# Patient Record
Sex: MALE | Race: WHITE
[De-identification: names, ages, dates, MRNs, and addresses within clinical notes are randomized per-mention and may not be internally consistent; named-entity substitution may affect disease eponyms.]

---

## 2021-04-09 ENCOUNTER — HOSPITAL ENCOUNTER (INPATIENT)
Dept: HOSPITAL 94 - ER | Age: 46
LOS: 5 days | Discharge: HOME HEALTH | DRG: 640 | End: 2021-04-14
Attending: FAMILY MEDICINE | Admitting: INTERNAL MEDICINE
Payer: MEDICARE

## 2021-04-09 VITALS — WEIGHT: 248.46 LBS | BODY MASS INDEX: 39 KG/M2 | HEIGHT: 67 IN

## 2021-04-09 DIAGNOSIS — I12.0: ICD-10-CM

## 2021-04-09 DIAGNOSIS — N18.6: ICD-10-CM

## 2021-04-09 DIAGNOSIS — Z88.0: ICD-10-CM

## 2021-04-09 DIAGNOSIS — E87.5: Primary | ICD-10-CM

## 2021-04-09 DIAGNOSIS — R26.2: ICD-10-CM

## 2021-04-09 DIAGNOSIS — Z88.2: ICD-10-CM

## 2021-04-09 DIAGNOSIS — Z91.15: ICD-10-CM

## 2021-04-09 DIAGNOSIS — E11.22: ICD-10-CM

## 2021-04-09 DIAGNOSIS — Z79.890: ICD-10-CM

## 2021-04-09 DIAGNOSIS — R19.7: ICD-10-CM

## 2021-04-09 DIAGNOSIS — E03.9: ICD-10-CM

## 2021-04-09 DIAGNOSIS — Z79.4: ICD-10-CM

## 2021-04-09 DIAGNOSIS — E87.70: ICD-10-CM

## 2021-04-09 DIAGNOSIS — E66.9: ICD-10-CM

## 2021-04-09 DIAGNOSIS — Z99.2: ICD-10-CM

## 2021-04-09 DIAGNOSIS — Z87.891: ICD-10-CM

## 2021-04-09 PROCEDURE — 70551 MRI BRAIN STEM W/O DYE: CPT

## 2021-04-09 PROCEDURE — 85025 COMPLETE CBC W/AUTO DIFF WBC: CPT

## 2021-04-09 PROCEDURE — 83036 HEMOGLOBIN GLYCOSYLATED A1C: CPT

## 2021-04-09 PROCEDURE — 97110 THERAPEUTIC EXERCISES: CPT

## 2021-04-09 PROCEDURE — 96374 THER/PROPH/DIAG INJ IV PUSH: CPT

## 2021-04-09 PROCEDURE — 99285 EMERGENCY DEPT VISIT HI MDM: CPT

## 2021-04-09 PROCEDURE — 97530 THERAPEUTIC ACTIVITIES: CPT

## 2021-04-09 PROCEDURE — 80048 BASIC METABOLIC PNL TOTAL CA: CPT

## 2021-04-09 PROCEDURE — 72148 MRI LUMBAR SPINE W/O DYE: CPT

## 2021-04-09 PROCEDURE — 93005 ELECTROCARDIOGRAM TRACING: CPT

## 2021-04-09 PROCEDURE — 36415 COLL VENOUS BLD VENIPUNCTURE: CPT

## 2021-04-09 PROCEDURE — 70544 MR ANGIOGRAPHY HEAD W/O DYE: CPT

## 2021-04-09 PROCEDURE — 85008 BL SMEAR W/O DIFF WBC COUNT: CPT

## 2021-04-09 PROCEDURE — 83735 ASSAY OF MAGNESIUM: CPT

## 2021-04-09 PROCEDURE — 72141 MRI NECK SPINE W/O DYE: CPT

## 2021-04-09 PROCEDURE — 97116 GAIT TRAINING THERAPY: CPT

## 2021-04-09 PROCEDURE — 71045 X-RAY EXAM CHEST 1 VIEW: CPT

## 2021-04-09 PROCEDURE — 84443 ASSAY THYROID STIM HORMONE: CPT

## 2021-04-09 PROCEDURE — 97162 PT EVAL MOD COMPLEX 30 MIN: CPT

## 2021-04-09 PROCEDURE — 72146 MRI CHEST SPINE W/O DYE: CPT

## 2021-04-09 PROCEDURE — 84100 ASSAY OF PHOSPHORUS: CPT

## 2021-04-09 PROCEDURE — 72131 CT LUMBAR SPINE W/O DYE: CPT

## 2021-04-09 PROCEDURE — 82948 REAGENT STRIP/BLOOD GLUCOSE: CPT

## 2021-04-09 NOTE — NUR
dr. behel updated regarding pt's med req issue- pt's caretaker will call back 
from [phone] tomorrow with list.

## 2021-04-09 NOTE — NUR
PT UNABLE TO PROVIDE MED REQ & INFO WAS NOT IN THE PAPERWORK FROM [facility]. PT'S 
CARETAKER IS AT WORK AND DOES NOT HAVE THIS INFO. PHARMACY UPDATED.

## 2021-04-10 VITALS — DIASTOLIC BLOOD PRESSURE: 82 MMHG | SYSTOLIC BLOOD PRESSURE: 116 MMHG

## 2021-04-10 VITALS — SYSTOLIC BLOOD PRESSURE: 132 MMHG | DIASTOLIC BLOOD PRESSURE: 76 MMHG

## 2021-04-10 VITALS — DIASTOLIC BLOOD PRESSURE: 99 MMHG | SYSTOLIC BLOOD PRESSURE: 144 MMHG

## 2021-04-10 LAB
ALBUMIN SERPL BCP-MCNC: 1.9 G/DL (ref 3.4–5)
ANION GAP SERPL CALCULATED.3IONS-SCNC: 4 MMOL/L (ref 8–16)
ANISOCYTOSIS BLD QL SMEAR: (no result)
BASOPHILS # BLD AUTO: 0.1 X10'3 (ref 0–0.2)
BASOPHILS NFR BLD AUTO: 1.6 % (ref 0–1)
BUN SERPL-MCNC: 54 MG/DL (ref 7–18)
BUN/CREAT SERPL: 8.8 (ref 5.4–32)
CALCIUM SERPL-MCNC: 8.8 MG/DL (ref 8.5–10.1)
CHLORIDE SERPL-SCNC: 99 MMOL/L (ref 99–107)
CO2 SERPL-SCNC: 31.6 MMOL/L (ref 24–32)
CREAT SERPL-MCNC: 6.12 MG/DL (ref 0.6–1.1)
EOSINOPHIL # BLD AUTO: 0.3 X10'3 (ref 0–0.9)
EOSINOPHIL NFR BLD AUTO: 3.7 % (ref 0–6)
ERYTHROCYTE [DISTWIDTH] IN BLOOD BY AUTOMATED COUNT: 19.1 % (ref 11.5–14.5)
GFR SERPL CREATININE-BSD FRML MDRD: 10 ML/MIN
GLUCOSE SERPL-MCNC: 105 MG/DL (ref 70–104)
HCT VFR BLD AUTO: 34 % (ref 42–52)
HGB BLD-MCNC: 11 G/DL (ref 14–17.9)
LYMPHOCYTES # BLD AUTO: 1.5 X10'3 (ref 1.1–4.8)
LYMPHOCYTES NFR BLD AUTO: 20.6 % (ref 21–51)
MCH RBC QN AUTO: 30.4 PG (ref 27–31)
MCHC RBC AUTO-ENTMCNC: 32.5 G/DL (ref 33–36.5)
MCV RBC AUTO: 93.5 FL (ref 78–98)
MONOCYTES # BLD AUTO: 1 X10'3 (ref 0–0.9)
MONOCYTES NFR BLD AUTO: 12.9 % (ref 2–12)
NEUTROPHILS # BLD AUTO: 4.5 X10'3 (ref 1.8–7.7)
NEUTROPHILS NFR BLD AUTO: 61.2 % (ref 42–75)
PLATELET # BLD AUTO: 219 X10'3 (ref 140–440)
PLATELET BLD QL SMEAR: NORMAL
PMV BLD AUTO: 7.9 FL (ref 7.4–10.4)
POTASSIUM SERPL-SCNC: 5.3 MMOL/L (ref 3.5–5.1)
RBC # BLD AUTO: 3.63 X10'6 (ref 4.7–6.1)
RBC MORPH BLD: (no result)
SCHISTOCYTES BLD QL SMEAR: (no result)
SODIUM SERPL-SCNC: 135 MMOL/L (ref 135–145)
WBC # BLD AUTO: 7.4 X10'3 (ref 4.5–11)

## 2021-04-10 PROCEDURE — 5A1D70Z PERFORMANCE OF URINARY FILTRATION, INTERMITTENT, LESS THAN 6 HOURS PER DAY: ICD-10-PCS | Performed by: INTERNAL MEDICINE

## 2021-04-10 RX ADMIN — INSULIN LISPRO SCH UNITS: 100 INJECTION, SOLUTION INTRAVENOUS; SUBCUTANEOUS at 22:06

## 2021-04-10 RX ADMIN — INSULIN LISPRO SCH UNITS: 100 INJECTION, SOLUTION INTRAVENOUS; SUBCUTANEOUS at 19:39

## 2021-04-10 RX ADMIN — INSULIN GLARGINE SCH UNIT: 100 INJECTION, SOLUTION SUBCUTANEOUS at 22:03

## 2021-04-10 NOTE — NUR
NEO MATTA 2027B: Pt is type 2 diabetic confirmed by daughter today. Please put order 
in for hyper/hypoglycemic protocol. thanks- viki smiley ext [phone]

## 2021-04-10 NOTE — NUR
Dr Hooper called asking why the pt wasn't admitted to a room yet as he needs 
dialysis today. Let him know they are working on it. He said to call the 
dialysis nurse when he is heading to a room. Spoke with Oliva, nursing 
supervisor regarding this and she said that it will be at least 2 1/2 hours 
before there will be a bed available. They are aware that the pt needs dialysis 
today.

## 2021-04-11 VITALS — DIASTOLIC BLOOD PRESSURE: 86 MMHG | SYSTOLIC BLOOD PRESSURE: 119 MMHG

## 2021-04-11 VITALS — DIASTOLIC BLOOD PRESSURE: 84 MMHG | SYSTOLIC BLOOD PRESSURE: 123 MMHG

## 2021-04-11 VITALS — DIASTOLIC BLOOD PRESSURE: 78 MMHG | SYSTOLIC BLOOD PRESSURE: 137 MMHG

## 2021-04-11 VITALS — DIASTOLIC BLOOD PRESSURE: 80 MMHG | SYSTOLIC BLOOD PRESSURE: 118 MMHG

## 2021-04-11 LAB
ALBUMIN SERPL BCP-MCNC: 1.9 G/DL (ref 3.4–5)
ANION GAP SERPL CALCULATED.3IONS-SCNC: 7 MMOL/L (ref 8–16)
ANISOCYTOSIS BLD QL SMEAR: (no result)
BASOPHILS # BLD AUTO: 0.1 X10'3 (ref 0–0.2)
BASOPHILS NFR BLD AUTO: 1.9 % (ref 0–1)
BUN SERPL-MCNC: 51 MG/DL (ref 7–18)
BUN/CREAT SERPL: 9.3 (ref 5.4–32)
BURR CELLS BLD QL SMEAR: (no result)
CALCIUM SERPL-MCNC: 8.2 MG/DL (ref 8.5–10.1)
CHLORIDE SERPL-SCNC: 98 MMOL/L (ref 99–107)
CO2 SERPL-SCNC: 29.3 MMOL/L (ref 24–32)
CREAT SERPL-MCNC: 5.51 MG/DL (ref 0.6–1.1)
EOSINOPHIL # BLD AUTO: 0.2 X10'3 (ref 0–0.9)
EOSINOPHIL NFR BLD AUTO: 3.1 % (ref 0–6)
ERYTHROCYTE [DISTWIDTH] IN BLOOD BY AUTOMATED COUNT: 19.1 % (ref 11.5–14.5)
GFR SERPL CREATININE-BSD FRML MDRD: 11 ML/MIN
GLUCOSE SERPL-MCNC: 364 MG/DL (ref 70–104)
HCT VFR BLD AUTO: 31.3 % (ref 42–52)
HGB BLD-MCNC: 10.3 G/DL (ref 14–17.9)
HYPOCHROMIA BLD QL SMEAR: (no result)
LYMPHOCYTES # BLD AUTO: 1.3 X10'3 (ref 1.1–4.8)
LYMPHOCYTES NFR BLD AUTO: 19 % (ref 21–51)
MAGNESIUM SERPL-MCNC: 1.9 MG/DL (ref 1.5–2.4)
MCH RBC QN AUTO: 31 PG (ref 27–31)
MCHC RBC AUTO-ENTMCNC: 32.9 G/DL (ref 33–36.5)
MCV RBC AUTO: 94.3 FL (ref 78–98)
MONOCYTES # BLD AUTO: 0.7 X10'3 (ref 0–0.9)
MONOCYTES NFR BLD AUTO: 11 % (ref 2–12)
NEUTROPHILS # BLD AUTO: 4.4 X10'3 (ref 1.8–7.7)
NEUTROPHILS NFR BLD AUTO: 65 % (ref 42–75)
PHOSPHATE SERPL-MCNC: 5.3 MG/DL (ref 2.3–4.5)
PLATELET # BLD AUTO: 217 X10'3 (ref 140–440)
PLATELET BLD QL SMEAR: NORMAL
PMV BLD AUTO: 8.6 FL (ref 7.4–10.4)
POLYCHROMASIA BLD QL SMEAR: (no result)
POTASSIUM SERPL-SCNC: 5.1 MMOL/L (ref 3.5–5.1)
RBC # BLD AUTO: 3.32 X10'6 (ref 4.7–6.1)
RBC MORPH BLD: (no result)
SCHISTOCYTES BLD QL SMEAR: (no result)
SODIUM SERPL-SCNC: 134 MMOL/L (ref 135–145)
WBC # BLD AUTO: 6.8 X10'3 (ref 4.5–11)

## 2021-04-11 RX ADMIN — INSULIN LISPRO SCH UNITS: 100 INJECTION, SOLUTION INTRAVENOUS; SUBCUTANEOUS at 08:50

## 2021-04-11 RX ADMIN — DEXTROSE MONOHYDRATE PRN ML: 25 INJECTION, SOLUTION INTRAVENOUS at 22:41

## 2021-04-11 RX ADMIN — INSULIN LISPRO SCH UNITS: 100 INJECTION, SOLUTION INTRAVENOUS; SUBCUTANEOUS at 19:13

## 2021-04-11 RX ADMIN — LEVOTHYROXINE SODIUM ANHYDROUS SCH MCG: 100 INJECTION, POWDER, LYOPHILIZED, FOR SOLUTION INTRAVENOUS at 11:45

## 2021-04-11 RX ADMIN — INSULIN GLARGINE SCH UNIT: 100 INJECTION, SOLUTION SUBCUTANEOUS at 21:00

## 2021-04-11 RX ADMIN — DEXTROSE MONOHYDRATE PRN ML: 25 INJECTION, SOLUTION INTRAVENOUS at 21:07

## 2021-04-11 RX ADMIN — INSULIN LISPRO SCH UNITS: 100 INJECTION, SOLUTION INTRAVENOUS; SUBCUTANEOUS at 13:32

## 2021-04-11 NOTE — NUR
Pt BS 55. Immediate re-check was 56. Pt asymptomatic, A&O, requested food. 2-OJ and sandwich 
given. Re-check in 15min was 63. 25ml D50 given. Re-check 98, will re-check in an hour. Pt 
also refused Lantus. 

-------------------------------------------------------------------------------

Addendum: 04/12/21 at 0521 by Karlee PURCELL RN

-------------------------------------------------------------------------------

Dr. Middleton notified of intermittent hypoglycemia and 25ml D50 given x2. No new orders d/t 
pt not needing additional fluids. During hypoglycemia, pt remained asymptomatic. Food and 
juice consumed. See MAR.

## 2021-04-11 NOTE — NUR
DM Consult: Pt admit DX fluid overload hx ESRD on HD and T2DM now insulin dependent per EMR. 
Per MD note, pt non-compliant w/ HD attendance and A1C this admit over 14. Pt seen by RD for 
written/verbal DM ed w/ RD contact information provided. Pt reports taking Novolog at meal 
times, and Trujeo once daily per Rx, checks GLU routinely, and has no issues receiving 
refills, test strips, or taking meds. Pt reports GLU prior avg at home in 500's w/ A1C in 
13's but now recently in 200's; RD notified pt of current A1C over 14 and reviewed diet 
strategies for optimal carb portion sizing. Pt reports does not eat lunch typically on eats 
no CHO WB; RD educated pt on importance of consistent CHO intake and nutrition facts label 
interpretations to ensure appropriate CHO intake as well. Pt reports is trying to get 
established w/ local MD for DM management. RD encouraged pt to d/w PCP regarding 
endocrinologist coverage for DM given as medically able given A1C. Pt reports no further 
questions/concerns at this time. YOGESH d/w RN regarding carb controlled diet addition if MD 
agreeable given hx insulin-dependent DM  and  at this time only on renal diet.

-------------------------------------------------------------------------------

Addendum: 04/11/21 at 1319 by Paulo Ochoa RD

-------------------------------------------------------------------------------

Amended: Links added.

## 2021-04-11 NOTE — NUR
HD pt, is anuric no void this shift

-------------------------------------------------------------------------------

Addendum: 04/11/21 at 0437 by Mani Bradshaw RN

-------------------------------------------------------------------------------

Amended: Links added.

## 2021-04-12 VITALS — DIASTOLIC BLOOD PRESSURE: 74 MMHG | SYSTOLIC BLOOD PRESSURE: 116 MMHG

## 2021-04-12 VITALS — DIASTOLIC BLOOD PRESSURE: 80 MMHG | SYSTOLIC BLOOD PRESSURE: 121 MMHG

## 2021-04-12 VITALS — DIASTOLIC BLOOD PRESSURE: 91 MMHG | SYSTOLIC BLOOD PRESSURE: 139 MMHG

## 2021-04-12 VITALS — DIASTOLIC BLOOD PRESSURE: 61 MMHG | SYSTOLIC BLOOD PRESSURE: 98 MMHG

## 2021-04-12 VITALS — SYSTOLIC BLOOD PRESSURE: 120 MMHG | DIASTOLIC BLOOD PRESSURE: 64 MMHG

## 2021-04-12 VITALS — SYSTOLIC BLOOD PRESSURE: 143 MMHG | DIASTOLIC BLOOD PRESSURE: 84 MMHG

## 2021-04-12 VITALS — SYSTOLIC BLOOD PRESSURE: 124 MMHG | DIASTOLIC BLOOD PRESSURE: 64 MMHG

## 2021-04-12 LAB
ALBUMIN SERPL BCP-MCNC: 1.8 G/DL (ref 3.4–5)
ANION GAP SERPL CALCULATED.3IONS-SCNC: 6 MMOL/L (ref 8–16)
BASOPHILS # BLD AUTO: 0.1 X10'3 (ref 0–0.2)
BASOPHILS NFR BLD AUTO: 1.2 % (ref 0–1)
BUN SERPL-MCNC: 60 MG/DL (ref 7–18)
BUN/CREAT SERPL: 10 (ref 5.4–32)
CALCIUM SERPL-MCNC: 8.6 MG/DL (ref 8.5–10.1)
CHLORIDE SERPL-SCNC: 97 MMOL/L (ref 99–107)
CO2 SERPL-SCNC: 29.7 MMOL/L (ref 24–32)
CREAT SERPL-MCNC: 6.01 MG/DL (ref 0.6–1.1)
EOSINOPHIL # BLD AUTO: 0.3 X10'3 (ref 0–0.9)
EOSINOPHIL NFR BLD AUTO: 3.6 % (ref 0–6)
ERYTHROCYTE [DISTWIDTH] IN BLOOD BY AUTOMATED COUNT: 19.1 % (ref 11.5–14.5)
GFR SERPL CREATININE-BSD FRML MDRD: 10 ML/MIN
GLUCOSE SERPL-MCNC: 116 MG/DL (ref 70–104)
HCT VFR BLD AUTO: 32.3 % (ref 42–52)
HGB BLD-MCNC: 10.7 G/DL (ref 14–17.9)
LYMPHOCYTES # BLD AUTO: 1.5 X10'3 (ref 1.1–4.8)
LYMPHOCYTES NFR BLD AUTO: 18.6 % (ref 21–51)
MAGNESIUM SERPL-MCNC: 1.8 MG/DL (ref 1.5–2.4)
MCH RBC QN AUTO: 30.7 PG (ref 27–31)
MCHC RBC AUTO-ENTMCNC: 33 G/DL (ref 33–36.5)
MCV RBC AUTO: 93.1 FL (ref 78–98)
MONOCYTES # BLD AUTO: 1 X10'3 (ref 0–0.9)
MONOCYTES NFR BLD AUTO: 12.1 % (ref 2–12)
NEUTROPHILS # BLD AUTO: 5.2 X10'3 (ref 1.8–7.7)
NEUTROPHILS NFR BLD AUTO: 64.5 % (ref 42–75)
PHOSPHATE SERPL-MCNC: 5.7 MG/DL (ref 2.3–4.5)
PLATELET # BLD AUTO: 226 X10'3 (ref 140–440)
PMV BLD AUTO: 8.2 FL (ref 7.4–10.4)
POTASSIUM SERPL-SCNC: 5 MMOL/L (ref 3.5–5.1)
RBC # BLD AUTO: 3.47 X10'6 (ref 4.7–6.1)
SODIUM SERPL-SCNC: 133 MMOL/L (ref 135–145)
WBC # BLD AUTO: 8.1 X10'3 (ref 4.5–11)

## 2021-04-12 PROCEDURE — 5A1D70Z PERFORMANCE OF URINARY FILTRATION, INTERMITTENT, LESS THAN 6 HOURS PER DAY: ICD-10-PCS | Performed by: INTERNAL MEDICINE

## 2021-04-12 RX ADMIN — DEXTROSE MONOHYDRATE PRN ML: 25 INJECTION, SOLUTION INTRAVENOUS at 05:59

## 2021-04-12 RX ADMIN — INSULIN LISPRO SCH UNITS: 100 INJECTION, SOLUTION INTRAVENOUS; SUBCUTANEOUS at 20:48

## 2021-04-12 RX ADMIN — INSULIN GLARGINE SCH UNIT: 100 INJECTION, SOLUTION SUBCUTANEOUS at 20:49

## 2021-04-12 RX ADMIN — Medication PRN G: at 04:26

## 2021-04-12 RX ADMIN — INSULIN LISPRO SCH UNITS: 100 INJECTION, SOLUTION INTRAVENOUS; SUBCUTANEOUS at 19:00

## 2021-04-12 RX ADMIN — Medication PRN G: at 03:48

## 2021-04-12 RX ADMIN — LEVOTHYROXINE SODIUM ANHYDROUS SCH MCG: 100 INJECTION, POWDER, LYOPHILIZED, FOR SOLUTION INTRAVENOUS at 08:15

## 2021-04-12 NOTE — NUR
page to dr longoria: MAGALYS DONATO [id number]: MRI called stating their machine is down, and do not 
have an ETA for when it'll be fixed whether today or tomorrow. . Per Rufus dorantes ok to do 
MRI's outpatient. - [place] ext [phone]

## 2021-04-13 VITALS — SYSTOLIC BLOOD PRESSURE: 150 MMHG | DIASTOLIC BLOOD PRESSURE: 93 MMHG

## 2021-04-13 VITALS — SYSTOLIC BLOOD PRESSURE: 131 MMHG | DIASTOLIC BLOOD PRESSURE: 77 MMHG

## 2021-04-13 VITALS — SYSTOLIC BLOOD PRESSURE: 139 MMHG | DIASTOLIC BLOOD PRESSURE: 73 MMHG

## 2021-04-13 VITALS — SYSTOLIC BLOOD PRESSURE: 139 MMHG | DIASTOLIC BLOOD PRESSURE: 92 MMHG

## 2021-04-13 LAB
ALBUMIN SERPL BCP-MCNC: 1.9 G/DL (ref 3.4–5)
ANION GAP SERPL CALCULATED.3IONS-SCNC: 7 MMOL/L (ref 8–16)
ANISOCYTOSIS BLD QL SMEAR: (no result)
BASOPHILS # BLD AUTO: 0.1 X10'3 (ref 0–0.2)
BASOPHILS NFR BLD AUTO: 1.7 % (ref 0–1)
BUN SERPL-MCNC: 48 MG/DL (ref 7–18)
BUN/CREAT SERPL: 10.2 (ref 5.4–32)
CALCIUM SERPL-MCNC: 8.7 MG/DL (ref 8.5–10.1)
CHLORIDE SERPL-SCNC: 103 MMOL/L (ref 99–107)
CO2 SERPL-SCNC: 28.8 MMOL/L (ref 24–32)
CREAT SERPL-MCNC: 4.71 MG/DL (ref 0.6–1.1)
EOSINOPHIL # BLD AUTO: 0.3 X10'3 (ref 0–0.9)
EOSINOPHIL NFR BLD AUTO: 3.9 % (ref 0–6)
ERYTHROCYTE [DISTWIDTH] IN BLOOD BY AUTOMATED COUNT: 19.3 % (ref 11.5–14.5)
GFR SERPL CREATININE-BSD FRML MDRD: 14 ML/MIN
GLUCOSE SERPL-MCNC: 81 MG/DL (ref 70–104)
HCT VFR BLD AUTO: 31.9 % (ref 42–52)
HGB BLD-MCNC: 10.4 G/DL (ref 14–17.9)
HYPOCHROMIA BLD QL SMEAR: (no result)
LYMPHOCYTES # BLD AUTO: 1.6 X10'3 (ref 1.1–4.8)
LYMPHOCYTES NFR BLD AUTO: 18.9 % (ref 21–51)
MAGNESIUM SERPL-MCNC: 1.9 MG/DL (ref 1.5–2.4)
MCH RBC QN AUTO: 30.7 PG (ref 27–31)
MCHC RBC AUTO-ENTMCNC: 32.6 G/DL (ref 33–36.5)
MCV RBC AUTO: 94.2 FL (ref 78–98)
MONOCYTES # BLD AUTO: 1.1 X10'3 (ref 0–0.9)
MONOCYTES NFR BLD AUTO: 12.4 % (ref 2–12)
NEUTROPHILS # BLD AUTO: 5.4 X10'3 (ref 1.8–7.7)
NEUTROPHILS NFR BLD AUTO: 63.1 % (ref 42–75)
PHOSPHATE SERPL-MCNC: 4.8 MG/DL (ref 2.3–4.5)
PLATELET # BLD AUTO: 233 X10'3 (ref 140–440)
PLATELET BLD QL SMEAR: NORMAL
PMV BLD AUTO: 7.9 FL (ref 7.4–10.4)
POTASSIUM SERPL-SCNC: 4.6 MMOL/L (ref 3.5–5.1)
RBC # BLD AUTO: 3.39 X10'6 (ref 4.7–6.1)
RBC MORPH BLD: (no result)
SCHISTOCYTES BLD QL SMEAR: (no result)
SODIUM SERPL-SCNC: 139 MMOL/L (ref 135–145)
WBC # BLD AUTO: 8.5 X10'3 (ref 4.5–11)

## 2021-04-13 PROCEDURE — 5A1D70Z PERFORMANCE OF URINARY FILTRATION, INTERMITTENT, LESS THAN 6 HOURS PER DAY: ICD-10-PCS | Performed by: INTERNAL MEDICINE

## 2021-04-13 RX ADMIN — INSULIN GLARGINE SCH UNIT: 100 INJECTION, SOLUTION SUBCUTANEOUS at 21:00

## 2021-04-13 RX ADMIN — LEVOTHYROXINE SODIUM ANHYDROUS SCH MCG: 100 INJECTION, POWDER, LYOPHILIZED, FOR SOLUTION INTRAVENOUS at 07:56

## 2021-04-13 NOTE — NUR
Initial: Pt admit dx fluid overload and hyperkalemia. Pt missed 

hemodialysis due to having diarrhea. At latest HD 5 L of fluid was 

removed. Pt PO intake at meals avg 100% on a carb control, renal diet 

partially meeting needs. YOGESH intern discussed with dietary to send double 

protein with meals to meet protein/kcal need and for satiety. Last BM 

4/13. Will continue to monitor for further intervention. 

Recommendations

1) Continue with carb control renal diet

2) Bowel care per rx

3) Double protein with meals for satiety/protein needs on HD

4) Wts with HD

-------------------------------------------------------------------------------

Addendum: 04/13/21 at 1541 by Sally PITTS INTERN YOGESH

-------------------------------------------------------------------------------

Amended: Links added.

-------------------------------------------------------------------------------

Addendum: 04/13/21 at 1544 by Sally PITTS INTERN YOGESH

-------------------------------------------------------------------------------

Correction double protein BIDLD for satiety/protein needs

-------------------------------------------------------------------------------

Addendum: 04/13/21 at 1546 by Paulo Ochoa RD

-------------------------------------------------------------------------------

YOGESH flores w/ above dietetic intern note.

## 2021-04-14 VITALS — DIASTOLIC BLOOD PRESSURE: 82 MMHG | SYSTOLIC BLOOD PRESSURE: 143 MMHG

## 2021-04-14 VITALS — DIASTOLIC BLOOD PRESSURE: 83 MMHG | SYSTOLIC BLOOD PRESSURE: 145 MMHG

## 2021-04-14 VITALS — DIASTOLIC BLOOD PRESSURE: 81 MMHG | SYSTOLIC BLOOD PRESSURE: 148 MMHG

## 2021-04-14 LAB
ALBUMIN SERPL BCP-MCNC: 2.3 G/DL (ref 3.4–5)
ANION GAP SERPL CALCULATED.3IONS-SCNC: 11 MMOL/L (ref 8–16)
ANISOCYTOSIS BLD QL SMEAR: (no result)
BASOPHILS # BLD AUTO: 0.1 X10'3 (ref 0–0.2)
BASOPHILS NFR BLD AUTO: 1.3 % (ref 0–1)
BUN SERPL-MCNC: 48 MG/DL (ref 7–18)
BUN/CREAT SERPL: 10.6 (ref 5.4–32)
CALCIUM SERPL-MCNC: 9 MG/DL (ref 8.5–10.1)
CHLORIDE SERPL-SCNC: 99 MMOL/L (ref 99–107)
CO2 SERPL-SCNC: 25.7 MMOL/L (ref 24–32)
CREAT SERPL-MCNC: 4.52 MG/DL (ref 0.6–1.1)
EOSINOPHIL # BLD AUTO: 0.2 X10'3 (ref 0–0.9)
EOSINOPHIL NFR BLD AUTO: 2.9 % (ref 0–6)
ERYTHROCYTE [DISTWIDTH] IN BLOOD BY AUTOMATED COUNT: 19.1 % (ref 11.5–14.5)
GFR SERPL CREATININE-BSD FRML MDRD: 14 ML/MIN
GLUCOSE SERPL-MCNC: 463 MG/DL (ref 70–104)
HCT VFR BLD AUTO: 29.1 % (ref 42–52)
HGB BLD-MCNC: 9.5 G/DL (ref 14–17.9)
HYPOCHROMIA BLD QL SMEAR: (no result)
LYMPHOCYTES # BLD AUTO: 1.4 X10'3 (ref 1.1–4.8)
LYMPHOCYTES NFR BLD AUTO: 19.7 % (ref 21–51)
MAGNESIUM SERPL-MCNC: 2 MG/DL (ref 1.5–2.4)
MCH RBC QN AUTO: 30.9 PG (ref 27–31)
MCHC RBC AUTO-ENTMCNC: 32.6 G/DL (ref 33–36.5)
MCV RBC AUTO: 94.7 FL (ref 78–98)
MONOCYTES # BLD AUTO: 0.8 X10'3 (ref 0–0.9)
MONOCYTES NFR BLD AUTO: 11.6 % (ref 2–12)
NEUTROPHILS # BLD AUTO: 4.5 X10'3 (ref 1.8–7.7)
NEUTROPHILS NFR BLD AUTO: 64.5 % (ref 42–75)
PHOSPHATE SERPL-MCNC: 5.1 MG/DL (ref 2.3–4.5)
PLATELET # BLD AUTO: 240 X10'3 (ref 140–440)
PLATELET BLD QL SMEAR: NORMAL
PMV BLD AUTO: 8.1 FL (ref 7.4–10.4)
POTASSIUM SERPL-SCNC: 5.5 MMOL/L (ref 3.5–5.1)
RBC # BLD AUTO: 3.07 X10'6 (ref 4.7–6.1)
RBC MORPH BLD: (no result)
SODIUM SERPL-SCNC: 136 MMOL/L (ref 135–145)
TARGETS BLD QL SMEAR: (no result)
WBC # BLD AUTO: 6.9 X10'3 (ref 4.5–11)

## 2021-04-14 RX ADMIN — LEVOTHYROXINE SODIUM ANHYDROUS SCH MCG: 100 INJECTION, POWDER, LYOPHILIZED, FOR SOLUTION INTRAVENOUS at 07:42

## 2021-04-14 RX ADMIN — INSULIN LISPRO SCH UNITS: 100 INJECTION, SOLUTION INTRAVENOUS; SUBCUTANEOUS at 13:31

## 2021-04-14 RX ADMIN — INSULIN LISPRO SCH UNITS: 100 INJECTION, SOLUTION INTRAVENOUS; SUBCUTANEOUS at 08:44

## 2021-04-14 RX ADMIN — INSULIN LISPRO SCH UNITS: 100 INJECTION, SOLUTION INTRAVENOUS; SUBCUTANEOUS at 10:40

## 2021-04-14 RX ADMIN — INSULIN LISPRO SCH UNITS: 100 INJECTION, SOLUTION INTRAVENOUS; SUBCUTANEOUS at 07:51

## 2021-04-14 NOTE — NUR
Patient in room PCU 3023. I have received report from Karlee CORONEL and had the opportunity to ask 
questions and assume patient care.

## 2021-04-14 NOTE — NUR
PAGER ID:  [phone] 

MESSAGE:  Re: Nicolás Stevens. Room: 302. Critical morning blood glucose of 480. Will cover 
with protocol insulin. -[facility] #[id number]



Dr. Amaya paged concerning Pt's morning blood sugar.

## 2021-04-14 NOTE — NUR
Pt' DC'd home.  Pt alert and oriented and vitals WNL.  Per Dr. Amaya; pt is stable for DC.  
IV removed and canula intact.  Tele-box removed and returned to tele-tech.  DC paperwork 
printed out and gone over with Pt.  Allowed Pt to ask questions concerning DC and then 
answered them.  New prescriptions called into [facility] pharmacy in [facility].  Pt will 
continue with dialysis schedule of Tuesday, Thursday and Saturday.  Pt stated that he will 
make a follow up appt with his PCP.  Written order for kidney ultra sound given to Pt to be 
done Outpatient.  Pt's belongings gathered and sent with Pt.  Pt wheeled down to lobby in 
wheelchair where they left in [facility] transit van for home in [facility].